# Patient Record
Sex: MALE | Race: WHITE | Employment: FULL TIME
[De-identification: names, ages, dates, MRNs, and addresses within clinical notes are randomized per-mention and may not be internally consistent; named-entity substitution may affect disease eponyms.]

---

## 2020-10-26 ENCOUNTER — NURSE TRIAGE (OUTPATIENT)
Dept: OTHER | Facility: CLINIC | Age: 30
End: 2020-10-26

## 2020-10-26 NOTE — TELEPHONE ENCOUNTER
Reason for Disposition   Pain also in shoulder(s) or arm(s) or jaw    Answer Assessment - Initial Assessment Questions  1. LOCATION: \"Where does it hurt? \"        Left chest    2. RADIATION: \"Does the pain go anywhere else? \" (e.g., into neck, jaw, arms, back)      Left shoulder    3. ONSET: \"When did the chest pain begin? \" (Minutes, hours or days)       Every other week for the last two months. This episode started today. 4. PATTERN \"Does the pain come and go, or has it been constant since it started? \"  \"Does it get worse with exertion? \"       Comes and goes    5. DURATION: \"How long does it last\" (e.g., seconds, minutes, hours)      The first time it was about a day and then a few days the second time. It is a dull pain that lasts all day     6. SEVERITY: \"How bad is the pain? \"  (e.g., Scale 1-10; mild, moderate, or severe)     - MILD (1-3): doesn't interfere with normal activities      - MODERATE (4-7): interferes with normal activities or awakens from sleep     - SEVERE (8-10): excruciating pain, unable to do any normal activities        Mild, but there are waves of moderate pain    7. CARDIAC RISK FACTORS: \"Do you have any history of heart problems or risk factors for heart disease? \" (e.g., angina, prior heart attack; diabetes, high blood pressure, high cholesterol, smoker, or strong family history of heart disease)      Denies - family history    8. PULMONARY RISK FACTORS: \"Do you have any history of lung disease? \"  (e.g., blood clots in lung, asthma, emphysema, birth control pills)      Denies    9. CAUSE: \"What do you think is causing the chest pain? \"      Unknown - is concerned about family history of heart disease    10. OTHER SYMPTOMS: \"Do you have any other symptoms? \" (e.g., dizziness, nausea, vomiting, sweating, fever, difficulty breathing, cough)        Denies all listed    11. PREGNANCY: \"Is there any chance you are pregnant? \" \"When was your last menstrual period? \"        N/A    Protocols used:

## 2020-10-28 ENCOUNTER — VIRTUAL VISIT (OUTPATIENT)
Dept: FAMILY MEDICINE CLINIC | Age: 30
End: 2020-10-28
Payer: COMMERCIAL

## 2020-10-28 PROCEDURE — G8484 FLU IMMUNIZE NO ADMIN: HCPCS | Performed by: FAMILY MEDICINE

## 2020-10-28 PROCEDURE — G8421 BMI NOT CALCULATED: HCPCS | Performed by: FAMILY MEDICINE

## 2020-10-28 PROCEDURE — G8427 DOCREV CUR MEDS BY ELIG CLIN: HCPCS | Performed by: FAMILY MEDICINE

## 2020-10-28 PROCEDURE — 4004F PT TOBACCO SCREEN RCVD TLK: CPT | Performed by: FAMILY MEDICINE

## 2020-10-28 PROCEDURE — 99203 OFFICE O/P NEW LOW 30 MIN: CPT | Performed by: FAMILY MEDICINE

## 2020-10-28 ASSESSMENT — PATIENT HEALTH QUESTIONNAIRE - PHQ9
SUM OF ALL RESPONSES TO PHQ QUESTIONS 1-9: 0
2. FEELING DOWN, DEPRESSED OR HOPELESS: 0
SUM OF ALL RESPONSES TO PHQ9 QUESTIONS 1 & 2: 0
SUM OF ALL RESPONSES TO PHQ QUESTIONS 1-9: 0
SUM OF ALL RESPONSES TO PHQ QUESTIONS 1-9: 0
1. LITTLE INTEREST OR PLEASURE IN DOING THINGS: 0

## 2020-10-28 NOTE — PROGRESS NOTES
10/28/2020    TELEHEALTH EVALUATION -- Audio/Visual (During VVYON-47 public health emergency)    HPI:    Fabby Barajas (:  1990) has requested an audio/video evaluation for the following concern(s):    ER followup and to re-establish care. Was seen at [facility] ER on 10/26/20 for chest pain. Has had multiple episodes in past 2 months. Usually occurs at rest and is on L side of chest. Does not occur w exertion. Pain is a dull \"muscular\" pain that is 1-2/10 in intensity. Every once in a while gets worse. Usually lasts about 2 days and is constant during that time. No dizziness, shortness of breath. Pain does radiate to L shoulder. famhx early heart disease- PGF had MI at 39. Dad had CABG x 4 in late 45s. MGF had CABG in his 76s. No new activity lately. Pain is still present today but is very mild. No leg swelling. Review of Systems:  Gen:  Denies fever, chills, headaches. No weight loss  HEENT:  Denies cold symptoms, sore throat. CV:  Denies palpitations. Pulm:  Denies shortness of breath, cough. Abd:  Denies abdominal pain, change in bowel habits. Prior to Visit Medications    Not on File       No past medical history on file. Past Surgical History:   Procedure Laterality Date    WISDOM TOOTH EXTRACTION         Family History   Problem Relation Age of Onset    Heart Disease Paternal Grandfather 39    Heart Disease Father 39        CABG x 3    Heart Disease Maternal Grandfather 67        CABG x 3       No Known Allergies    Social History     Tobacco Use    Smoking status: Never Smoker    Smokeless tobacco: Never Used   Substance Use Topics    Alcohol use: Not on file    Drug use: Not on file          PHYSICAL EXAMINATION:  Vital Signs: (As obtained by patient/caregiver or practitioner observation)  There were no vitals taken for this visit.   Patient-Reported Vitals 10/28/2020   Patient-Reported Weight 215 lbs   Patient-Reported Height 6'4        Respiratory rate appears normal      Constitutional: Appears well-developed and well-nourished. No apparent distress    Mental status: Alert and awake. Oriented to person/place/time. Able to follow commands    Eyes: EOM normal. Sclera normal. No discharge visible  HENT: Normocephalic, atraumatic. Mouth/Throat: Mucous membranes are moist. External Ears Normal    Neck: No visualized mass   Pulmonary/Chest: Respiratory effort normal.  No visualized signs of difficulty breathing or respiratory distress        Musculoskeletal:  Normal range of motion of neck  Neurological:       No Facial Asymmetry (Cranial nerve 7 motor function) (limited exam to video visit). No gaze palsy       Skin:  No significant exanthematous lesions or discoloration noted on facial skin       Psychiatric: Normal Affect. No Hallucinations            ASSESSMENT/PLAN:  1. Chest pain, unspecified type  Labs as ordered  Will get stress test given famhx if unremarkable  - D-Dimer, Quantitative; Future  - Comprehensive Metabolic Panel; Future  - Lipid Panel; Future  - TSH with Reflex; Future    2. Family history of heart disease in male family member before age 54  - Lipid Panel; Future    3. Healthcare maintenance  - Comprehensive Metabolic Panel; Future  - Lipid Panel; Future  - TSH with Reflex; Future      No follow-ups on file. Katerine Garcia is a 30 y.o. male being evaluated by a Virtual Visit (video visit) encounter to address concerns as mentioned above. A caregiver was present when appropriate. Due to this being a TeleHealth encounter (During SVFAX-57 public health emergency), evaluation of the following organ systems was limited: Vitals/Constitutional/EENT/Resp/CV/GI//MS/Neuro/Skin/Heme-Lymph-Imm.   Pursuant to the emergency declaration under the [facility], [facility]5 waiver authority and the [employer] and [employer] Act, this Virtual Visit was conducted with patient's (and/or legal guardian's) consent, to reduce the patient's risk of exposure to COVID-19 and provide necessary medical care. The patient (and/or legal guardian) has also been advised to contact this office for worsening conditions or problems, and seek emergency medical treatment and/or call 911 if deemed necessary. Patient identification was verified at the start of the visit: Yes    Total time spent on this encounter: 20 minutes. Services were provided through a video synchronous discussion virtually to substitute for in-person clinic visit. Patient was located in their home. Provider was located in the office. --Aline Castle MD on 10/28/2020 at 5:52 PM    An electronic signature was used to authenticate this note. Natan Bush

## 2020-10-29 DIAGNOSIS — Z00.00 HEALTHCARE MAINTENANCE: ICD-10-CM

## 2020-10-29 DIAGNOSIS — R07.9 CHEST PAIN, UNSPECIFIED TYPE: ICD-10-CM

## 2020-10-29 DIAGNOSIS — Z82.49 FAMILY HISTORY OF HEART DISEASE IN MALE FAMILY MEMBER BEFORE AGE 55: ICD-10-CM

## 2020-10-29 LAB
A/G RATIO: 1.8 (ref 1.1–2.2)
ALBUMIN SERPL-MCNC: 4.7 G/DL (ref 3.4–5)
ALP BLD-CCNC: 98 U/L (ref 40–129)
ALT SERPL-CCNC: 20 U/L (ref 10–40)
ANION GAP SERPL CALCULATED.3IONS-SCNC: 14 MMOL/L (ref 3–16)
AST SERPL-CCNC: 16 U/L (ref 15–37)
BILIRUB SERPL-MCNC: 1.4 MG/DL (ref 0–1)
BUN BLDV-MCNC: 16 MG/DL (ref 7–20)
CALCIUM SERPL-MCNC: 9.8 MG/DL (ref 8.3–10.6)
CHLORIDE BLD-SCNC: 99 MMOL/L (ref 99–110)
CHOLESTEROL, TOTAL: 122 MG/DL (ref 0–199)
CO2: 26 MMOL/L (ref 21–32)
CREAT SERPL-MCNC: 1 MG/DL (ref 0.9–1.3)
D DIMER: 1981 NG/ML DDU (ref 0–229)
GFR AFRICAN AMERICAN: >60
GFR NON-AFRICAN AMERICAN: >60
GLOBULIN: 2.6 G/DL
GLUCOSE BLD-MCNC: 84 MG/DL (ref 70–99)
HDLC SERPL-MCNC: 37 MG/DL (ref 40–60)
LDL CHOLESTEROL CALCULATED: 59 MG/DL
POTASSIUM SERPL-SCNC: 4.6 MMOL/L (ref 3.5–5.1)
SODIUM BLD-SCNC: 139 MMOL/L (ref 136–145)
TOTAL PROTEIN: 7.3 G/DL (ref 6.4–8.2)
TRIGL SERPL-MCNC: 128 MG/DL (ref 0–150)
TSH REFLEX: 1.01 UIU/ML (ref 0.27–4.2)
VLDLC SERPL CALC-MCNC: 26 MG/DL

## 2020-10-30 ENCOUNTER — TELEPHONE (OUTPATIENT)
Dept: FAMILY MEDICINE CLINIC | Age: 30
End: 2020-10-30

## 2020-10-30 NOTE — TELEPHONE ENCOUNTER
Reviewed the labs and elevated d dimer and has symptoms of Chest pain and hence requested to go to get ER to get the CTA of lungs to rule out blood clots

## 2020-11-04 ENCOUNTER — VIRTUAL VISIT (OUTPATIENT)
Dept: FAMILY MEDICINE CLINIC | Age: 30
End: 2020-11-04
Payer: COMMERCIAL

## 2020-11-04 PROCEDURE — G8427 DOCREV CUR MEDS BY ELIG CLIN: HCPCS | Performed by: FAMILY MEDICINE

## 2020-11-04 PROCEDURE — 99213 OFFICE O/P EST LOW 20 MIN: CPT | Performed by: FAMILY MEDICINE

## 2020-11-04 NOTE — PROGRESS NOTES
2020    TELEHEALTH EVALUATION -- Audio/Visual (During HADJV-17 public health emergency)    HPI:    Bijan Mcgregor (:  1990) has requested an audio/video evaluation for the following concern(s):    F/u labs/CT/chest pain. Has not had any further chest pain since last visit. No shortness of breath. D dimer did come back elevated and he had CT done. Is concerned about heart disease given his family hx. Review of Systems:  Gen:  Denies fever, chills, headaches. No weight loss  HEENT:  Denies cold symptoms, sore throat. CV:  Denies chest pain or tightness, palpitations. Pulm:  Denies shortness of breath, cough. Abd:  Denies abdominal pain, change in bowel habits. Prior to Visit Medications    Not on File       No past medical history on file. Past Surgical History:   Procedure Laterality Date    WISDOM TOOTH EXTRACTION         Family History   Problem Relation Age of Onset    Heart Disease Paternal Grandfather 39    Heart Disease Father 39        CABG x 3    Heart Disease Maternal Grandfather 67        CABG x 3       No Known Allergies    Social History     Tobacco Use    Smoking status: Never Smoker    Smokeless tobacco: Never Used   Substance Use Topics    Alcohol use: Not on file    Drug use: Not on file          PHYSICAL EXAMINATION:  Vital Signs: (As obtained by patient/caregiver or practitioner observation)  There were no vitals taken for this visit. Patient-Reported Vitals 2020   Patient-Reported Weight -   Patient-Reported Height 6 4        Respiratory rate appears normal      Constitutional: Appears well-developed and well-nourished. No apparent distress    Mental status: Alert and awake. Oriented to person/place/time. Able to follow commands    Eyes: EOM normal. Sclera normal. No discharge visible  HENT: Normocephalic, atraumatic.    Mouth/Throat: Mucous membranes are moist. External Ears Normal    Neck: No visualized mass   Pulmonary/Chest: Respiratory effort normal.  No visualized signs of difficulty breathing or respiratory distress        Musculoskeletal:  Normal range of motion of neck  Neurological:       No Facial Asymmetry (Cranial nerve 7 motor function) (limited exam to video visit). No gaze palsy       Skin:  No significant exanthematous lesions or discoloration noted on facial skin       Psychiatric: Normal Affect. No Hallucinations              ASSESSMENT/PLAN:  1. Chest pain, unspecified type  Workup reviewed w pt today. Labs remarkable for elevated D dimer of 1,981. Sent for CT which was negative. Bedside cardiac US neg for pericardial effusion as well. Will get stress test given famhx  To ER if pain recurs  - CARDIAC STRESS TEST EXERCISE ONLY; Future    2. Family history of heart disease in male family member before age 54  - Roheline 44; Future      No follow-ups on file. Lysbeth Ormond is a 30 y.o. male being evaluated by a Virtual Visit (video visit) encounter to address concerns as mentioned above. A caregiver was present when appropriate. Due to this being a TeleHealth encounter (During [facility]- public health emergency), evaluation of the following organ systems was limited: Vitals/Constitutional/EENT/Resp/CV/GI//MS/Neuro/Skin/Heme-Lymph-Imm. Pursuant to the emergency declaration under the [facility], [address] [place] [address] authority and the [employer] and [employer] Act, this Virtual Visit was conducted with patient's (and/or legal guardian's) consent, to reduce the patient's risk of exposure to COVID-19 and provide necessary medical care. The patient (and/or legal guardian) has also been advised to contact this office for worsening conditions or problems, and seek emergency medical treatment and/or call 911 if deemed necessary. Patient identification was verified at the start of the visit: Yes    Total time spent on this encounter: 12 minutes.      Services were provided through a video synchronous discussion virtually to substitute for in-person clinic visit. Patient was located in their home. Provider was located in their home. --Roxana Avila MD on 11/4/2020 at 1:38 PM    An electronic signature was used to authenticate this note. Alaina Estevez

## 2020-11-16 ENCOUNTER — OFFICE VISIT (OUTPATIENT)
Dept: PRIMARY CARE CLINIC | Age: 30
End: 2020-11-16
Payer: COMMERCIAL

## 2020-11-16 PROCEDURE — 99211 OFF/OP EST MAY X REQ PHY/QHP: CPT | Performed by: NURSE PRACTITIONER

## 2020-11-16 NOTE — PROGRESS NOTES
Jackson Shah received a viral test for COVID-19. They were educated on isolation and quarantine as appropriate. For any symptoms, they were directed to seek care from their PCP, given contact information to establish with a doctor, directed to an urgent care or the emergency room.

## 2020-11-16 NOTE — PATIENT INSTRUCTIONS

## 2020-11-17 LAB — SARS-COV-2: NOT DETECTED

## 2020-12-02 ENCOUNTER — HOSPITAL ENCOUNTER (OUTPATIENT)
Dept: NON INVASIVE DIAGNOSTICS | Age: 30
Discharge: HOME | End: 2020-12-02
Payer: COMMERCIAL

## 2020-12-02 LAB
LV EF: 55 %
LVEF MODALITY: NORMAL

## 2020-12-02 PROCEDURE — 3430000000 HC RX DIAGNOSTIC RADIOPHARMACEUTICAL: Performed by: FAMILY MEDICINE

## 2020-12-02 PROCEDURE — A9502 TC99M TETROFOSMIN: HCPCS | Performed by: FAMILY MEDICINE

## 2020-12-02 PROCEDURE — 78452 HT MUSCLE IMAGE SPECT MULT: CPT | Performed by: INTERNAL MEDICINE

## 2020-12-02 PROCEDURE — 93017 CV STRESS TEST TRACING ONLY: CPT | Performed by: INTERNAL MEDICINE

## 2020-12-02 RX ADMIN — TETROFOSMIN 10 MILLICURIE: 1.38 INJECTION, POWDER, LYOPHILIZED, FOR SOLUTION INTRAVENOUS at 08:19

## 2020-12-02 RX ADMIN — TETROFOSMIN 30 MILLICURIE: 1.38 INJECTION, POWDER, LYOPHILIZED, FOR SOLUTION INTRAVENOUS at 09:28

## 2020-12-02 NOTE — PROGRESS NOTES
Patient instructed on Juan Carlos Protocol Stress Test Procedure including possible side effects and adverse reactions. Verbalizes knowledge and understanding and denies having any questions.

## 2023-02-08 ENCOUNTER — OFFICE VISIT (OUTPATIENT)
Dept: FAMILY MEDICINE CLINIC | Age: 33
End: 2023-02-08
Payer: COMMERCIAL

## 2023-02-08 VITALS
OXYGEN SATURATION: 97 % | SYSTOLIC BLOOD PRESSURE: 128 MMHG | BODY MASS INDEX: 25.82 KG/M2 | WEIGHT: 212 LBS | HEIGHT: 76 IN | DIASTOLIC BLOOD PRESSURE: 64 MMHG | HEART RATE: 82 BPM

## 2023-02-08 DIAGNOSIS — Z00.00 ANNUAL PHYSICAL EXAM: Primary | ICD-10-CM

## 2023-02-08 PROCEDURE — 99395 PREV VISIT EST AGE 18-39: CPT | Performed by: FAMILY MEDICINE

## 2023-02-08 PROCEDURE — G8484 FLU IMMUNIZE NO ADMIN: HCPCS | Performed by: FAMILY MEDICINE

## 2023-02-08 ASSESSMENT — PATIENT HEALTH QUESTIONNAIRE - PHQ9
SUM OF ALL RESPONSES TO PHQ QUESTIONS 1-9: 0
1. LITTLE INTEREST OR PLEASURE IN DOING THINGS: 0
2. FEELING DOWN, DEPRESSED OR HOPELESS: 0
SUM OF ALL RESPONSES TO PHQ QUESTIONS 1-9: 0
SUM OF ALL RESPONSES TO PHQ9 QUESTIONS 1 & 2: 0

## 2023-02-08 NOTE — PROGRESS NOTES
SUBJECTIVE:   Thelma Bowling is a 33 y.o. male presenting for his annual checkup. HPI:    Had life insurance exam last year. TGs were high. Not watching diet. Strong famhx CAD. There is no problem list on file for this patient. No past medical history on file. Past Surgical History:   Procedure Laterality Date    WISDOM TOOTH EXTRACTION         Social History     Socioeconomic History    Marital status:      Spouse name: None    Number of children: 0    Years of education: None    Highest education level: None   Occupational History    Occupation:      Comment: [employer]   Tobacco Use    Smoking status: Never    Smokeless tobacco: Never   Substance and Sexual Activity    Sexual activity: Yes     Partners: Female       Family History   Problem Relation Age of Onset    Heart Disease Father 54        CABG x 4    Heart Disease Maternal Grandfather 70        CABG x 4    Heart Disease Paternal Grandfather 39       No current outpatient medications on file. No current facility-administered medications for this visit. Allergies: Patient has no known allergies.      Health Maintenance   Topic Date Due    COVID-19 Vaccine (1) Never done    Varicella vaccine (1 of 2 - 2-dose childhood series) Never done    Depression Screen  Never done    HIV screen  Never done    Hepatitis C screen  Never done    Flu vaccine (1) 08/01/2022    DTaP/Tdap/Td vaccine (7 - Td or Tdap) 03/02/2026    Hib vaccine  Completed    Meningococcal (ACWY) vaccine  Completed    Hepatitis A vaccine  Aged Out    Pneumococcal 0-64 years Vaccine  Aged Out         ROS:    Skin: no changing moles, abnormal pigmentation, rash, scaling, itching, masses, hair or nail changes  Eyes: no change in vision  Ears/Nose/Throat: no hearing loss, tinnitus, vertigo, nosebleed, nasal congestion, rhinorrhea, sore throat  Respiratory: no cough or shortness of breath  Cardiovascular: no chest pain, FREITAS, orthopnea, PND, palpitations, or claudication  Gastrointestinal: no nausea, vomiting, abdominal pain or change in stools. No blood in stools. Genitourinary: no urinary symptoms or incontinence. No erectile dysfunction. Musculoskeletal: no arthritis, arthralgia, myalgia or weakness  Neurologic: no weakness/numbness, seizures, or headaches  Hematologic/Lymphatic/Immunologic: no abnormal bleeding/bruising, fever, chills, night sweats, swollen glands, or unexplained weight loss  Endocrine: no heat or cold intolerance and no polyphagia, polydipsia, or polyuria    OBJECTIVE:   /64   Pulse 82   Ht 6' 4\" (1.93 m)   Wt 212 lb (96.2 kg)   SpO2 97%   BMI 25.81 kg/m²   General appearance: healthy, alert, no distress  Skin: Skin color, texture, turgor normal. No rashes or suspicious lesions. No induration or tightening palpated. Head: Normocephalic. No masses, lesions, tenderness or abnormalities  Eyes: Conjunctivae/corneas clear. PERRL, EOM's intact. Ears: External ears normal. Canals clear. TM's clear bilaterally. Hearing grossly normal.  Nose/Sinuses: Nares normal.   Oropharynx: Lips, mucosa, and tongue normal. Teeth and gums normal. Oropharynx clear with no exudate seen. Neck: Neck supple, and symmetric. No adenopathy. Thyroid symmetric, normal size, without nodule. Trachea is midline. Back: Back symmetric, no curvature. ROM normal. No CVA tenderness. Lungs: Good diaphragmatic excursion. Lungs clear to auscultation bilaterally. No retractions or use of accessory muscles. Heart: PMI is not displaced, and no thrill noted. Regular rate and rhythm, with no rub, murmur or gallop noted. Abdomen: Abdomen soft, non-tender. BS normal. No masses, organomegaly. No hernia noted. Extremities: Extremities normal. No deformities, edema, or skin discoloration. No cyanosis or clubbing noted to the nails. Hands and feet were warm and well-perfused with palpable dorsalis pedis pulses bilaterally.   Lymph: No lymphadenopathy of the neck or supraclavicular regions. Musculoskeletal: Spine ROM normal. Muscular strength intact. Neuro: Cranial nerves intact, gait normal. No focal weakness. ASSESSMENT/PLAN:  1. Annual physical exam  Labs as ordered  Diet/exercise discussed   Monitor home BP   - CBC with Auto Differential; Future  - Comprehensive Metabolic Panel; Future  - Lipid Panel; Future      All health maintenance issues were updated.   Recommend begin progressive daily aerobic exercise program, follow a low fat, low cholesterol diet, and reduce salt in diet and cooking

## 2023-06-22 ENCOUNTER — TELEMEDICINE (OUTPATIENT)
Dept: FAMILY MEDICINE CLINIC | Age: 33
End: 2023-06-22
Payer: COMMERCIAL

## 2023-06-22 DIAGNOSIS — J01.90 ACUTE BACTERIAL SINUSITIS: Primary | ICD-10-CM

## 2023-06-22 DIAGNOSIS — B96.89 ACUTE BACTERIAL SINUSITIS: Primary | ICD-10-CM

## 2023-06-22 PROCEDURE — 99213 OFFICE O/P EST LOW 20 MIN: CPT | Performed by: FAMILY MEDICINE

## 2023-06-22 PROCEDURE — G8428 CUR MEDS NOT DOCUMENT: HCPCS | Performed by: FAMILY MEDICINE

## 2023-06-22 RX ORDER — AMOXICILLIN 500 MG/1
500 CAPSULE ORAL 2 TIMES DAILY
Qty: 20 CAPSULE | Refills: 0 | Status: SHIPPED | OUTPATIENT
Start: 2023-06-22 | End: 2023-07-02

## 2023-06-22 NOTE — PROGRESS NOTES
2023    TELEHEALTH EVALUATION -- Audio/Visual (During BBFYG-72 public health emergency)    HPI:    Anna Ding (:  1990) has requested an audio/video evaluation for the following concern(s):    C/o 7 day hx of headache/sinus pressure, yellow mucus & cough, post nasal drainage. Symptoms seem to be worsening, not improving. Review of Systems:  Gen:  Denies fever, chills, headaches. No weight loss  HEENT:  Denies cold symptoms, sore throat. CV:  Denies chest pain or tightness, palpitations. Pulm:  Denies shortness of breath, cough. Abd:  Denies abdominal pain, change in bowel habits. Prior to Visit Medications    Not on File       No past medical history on file. Past Surgical History:   Procedure Laterality Date    WISDOM TOOTH EXTRACTION         Family History   Problem Relation Age of Onset    Heart Disease Father 54        CABG x 4    Heart Disease Maternal Grandfather 70        CABG x 4    Heart Disease Paternal Grandfather 39       No Known Allergies    Social History     Tobacco Use    Smoking status: Never    Smokeless tobacco: Never          PHYSICAL EXAMINATION:  Vital Signs: (As obtained by patient/caregiver or practitioner observation)  There were no vitals taken for this visit. Patient-Reported Vitals 2020   Patient-Reported Weight -   Patient-Reported Height 6 4        Respiratory rate appears normal      Constitutional: Appears well-developed and well-nourished. No apparent distress    Mental status: Alert and awake. Oriented to person/place/time. Able to follow commands    Eyes: EOM normal. Sclera normal. No discharge visible  HENT: Normocephalic, atraumatic.    Mouth/Throat: Mucous membranes are moist. External Ears Normal    Neck: No visualized mass   Pulmonary/Chest: Respiratory effort normal.  No visualized signs of difficulty breathing or respiratory distress        Musculoskeletal:  Normal range of motion of neck  Neurological:       No Facial Asymmetry (Cranial

## 2024-05-16 ENCOUNTER — TELEMEDICINE (OUTPATIENT)
Dept: FAMILY MEDICINE CLINIC | Age: 34
End: 2024-05-16

## 2024-05-16 DIAGNOSIS — L23.7 POISON IVY: Primary | ICD-10-CM

## 2024-05-16 RX ORDER — PREDNISONE 10 MG/1
10 TABLET ORAL DAILY
Qty: 18 TABLET | Refills: 0 | Status: SHIPPED | OUTPATIENT
Start: 2024-05-16

## 2024-05-16 SDOH — ECONOMIC STABILITY: FOOD INSECURITY: WITHIN THE PAST 12 MONTHS, YOU WORRIED THAT YOUR FOOD WOULD RUN OUT BEFORE YOU GOT MONEY TO BUY MORE.: NEVER TRUE

## 2024-05-16 SDOH — ECONOMIC STABILITY: HOUSING INSECURITY
IN THE LAST 12 MONTHS, WAS THERE A TIME WHEN YOU DID NOT HAVE A STEADY PLACE TO SLEEP OR SLEPT IN A SHELTER (INCLUDING NOW)?: NO

## 2024-05-16 SDOH — ECONOMIC STABILITY: FOOD INSECURITY: WITHIN THE PAST 12 MONTHS, THE FOOD YOU BOUGHT JUST DIDN'T LAST AND YOU DIDN'T HAVE MONEY TO GET MORE.: NEVER TRUE

## 2024-05-16 SDOH — ECONOMIC STABILITY: INCOME INSECURITY: HOW HARD IS IT FOR YOU TO PAY FOR THE VERY BASICS LIKE FOOD, HOUSING, MEDICAL CARE, AND HEATING?: NOT HARD AT ALL

## 2024-05-16 ASSESSMENT — PATIENT HEALTH QUESTIONNAIRE - PHQ9
1. LITTLE INTEREST OR PLEASURE IN DOING THINGS: NOT AT ALL
SUM OF ALL RESPONSES TO PHQ9 QUESTIONS 1 & 2: 0
SUM OF ALL RESPONSES TO PHQ QUESTIONS 1-9: 0
2. FEELING DOWN, DEPRESSED OR HOPELESS: NOT AT ALL
SUM OF ALL RESPONSES TO PHQ QUESTIONS 1-9: 0

## 2024-05-16 NOTE — PROGRESS NOTES
2024    TELEHEALTH EVALUATION -- Audio/Visual (During COVID-19 public health emergency)    HPI:    Miguel A Gonzalez (:  1990) has requested an audio/video evaluation for the following concern(s):    C/o poison ivy down whole left leg and left eyelid. Started yesterday. Has taken benadryl with some improvement.   Is prone to severe reaction to poison ivy.     Review of Systems:  Gen:  Denies fever, chills, headaches. No weight loss  HEENT:  Denies cold symptoms, sore throat.  CV:  Denies chest pain or tightness, palpitations.  Pulm:  Denies shortness of breath, cough.  Abd:  Denies abdominal pain, change in bowel habits.    Prior to Visit Medications    Not on File       No past medical history on file.    Past Surgical History:   Procedure Laterality Date    WISDOM TOOTH EXTRACTION         Family History   Problem Relation Age of Onset    Heart Disease Father 55        CABG x 4    Heart Disease Maternal Grandfather 71        CABG x 4    Heart Disease Paternal Grandfather 41       No Known Allergies    Social History     Tobacco Use    Smoking status: Never    Smokeless tobacco: Never          PHYSICAL EXAMINATION:  Vital Signs: (As obtained by patient/caregiver or practitioner observation)  There were no vitals taken for this visit.      2024    10:04 AM   Patient-Reported Vitals   Patient-Reported Weight 214   Patient-Reported Height 6'4\"        Respiratory rate appears normal      Constitutional: Appears well-developed and well-nourished. No apparent distress    Mental status: Alert and awake. Oriented to person/place/time. Able to follow commands    Eyes: EOM normal. Sclera normal. No discharge visible  HENT: Normocephalic, atraumatic.   Mouth/Throat: Mucous membranes are moist. External Ears Normal    Neck: No visualized mass   Pulmonary/Chest: Respiratory effort normal.  No visualized signs of difficulty breathing or respiratory distress        Musculoskeletal:  Normal range of motion of

## 2025-05-22 ENCOUNTER — TELEMEDICINE (OUTPATIENT)
Dept: FAMILY MEDICINE CLINIC | Age: 35
End: 2025-05-22
Payer: COMMERCIAL

## 2025-05-22 DIAGNOSIS — J01.90 ACUTE BACTERIAL SINUSITIS: Primary | ICD-10-CM

## 2025-05-22 DIAGNOSIS — B96.89 ACUTE BACTERIAL SINUSITIS: Primary | ICD-10-CM

## 2025-05-22 PROCEDURE — G2211 COMPLEX E/M VISIT ADD ON: HCPCS | Performed by: FAMILY MEDICINE

## 2025-05-22 PROCEDURE — 99213 OFFICE O/P EST LOW 20 MIN: CPT | Performed by: FAMILY MEDICINE

## 2025-05-22 RX ORDER — AZITHROMYCIN 250 MG/1
TABLET, FILM COATED ORAL
Qty: 6 TABLET | Refills: 0 | Status: SHIPPED | OUTPATIENT
Start: 2025-05-22 | End: 2025-06-01

## 2025-05-22 SDOH — ECONOMIC STABILITY: FOOD INSECURITY: WITHIN THE PAST 12 MONTHS, THE FOOD YOU BOUGHT JUST DIDN'T LAST AND YOU DIDN'T HAVE MONEY TO GET MORE.: NEVER TRUE

## 2025-05-22 SDOH — ECONOMIC STABILITY: FOOD INSECURITY: WITHIN THE PAST 12 MONTHS, YOU WORRIED THAT YOUR FOOD WOULD RUN OUT BEFORE YOU GOT MONEY TO BUY MORE.: NEVER TRUE

## 2025-05-22 ASSESSMENT — PATIENT HEALTH QUESTIONNAIRE - PHQ9
1. LITTLE INTEREST OR PLEASURE IN DOING THINGS: NOT AT ALL
SUM OF ALL RESPONSES TO PHQ QUESTIONS 1-9: 0
2. FEELING DOWN, DEPRESSED OR HOPELESS: NOT AT ALL

## 2025-05-22 NOTE — PROGRESS NOTES
2025    TELEHEALTH EVALUATION -- Audio/Visual (During COVID-19 public health emergency)    HPI:    Miguel A Gonzalez (:  1990) has requested an audio/video evaluation for the following concern(s):    C/o facial/sinus pressure, post nasal drainage, coughing up yellow sputum, head congestion that has been worsening x 2 days. No fever/chills. No nausea or vomiting.     Review of Systems:  Gen:  Denies fever, chills. No weight loss  HEENT:  Denies sore throat.  CV:  Denies chest pain or tightness, palpitations.  Pulm:  Denies shortness of breath.  Abd:  Denies abdominal pain, change in bowel habits.    Prior to Visit Medications    Not on File       No past medical history on file.    Past Surgical History:   Procedure Laterality Date    WISDOM TOOTH EXTRACTION         Family History   Problem Relation Age of Onset    Heart Disease Father 55        CABG x 4    Heart Disease Maternal Grandfather 71        CABG x 4    Heart Disease Paternal Grandfather 41       No Known Allergies    Social History     Tobacco Use    Smoking status: Never    Smokeless tobacco: Never          PHYSICAL EXAMINATION:  Vital Signs: (As obtained by patient/caregiver or practitioner observation)  There were no vitals taken for this visit.      2024    10:04 AM   Patient-Reported Vitals   Patient-Reported Weight 214   Patient-Reported Height 6'4\"        Respiratory rate appears normal      Constitutional: Appears well-developed and well-nourished. No apparent distress    Mental status: Alert and awake. Oriented to person/place/time. Able to follow commands    Eyes: EOM normal. Sclera normal. No discharge visible  HENT: Normocephalic, atraumatic.   Mouth/Throat: Mucous membranes are moist. External Ears Normal    Neck: No visualized mass   Pulmonary/Chest: Respiratory effort normal.  No visualized signs of difficulty breathing or respiratory distress        Musculoskeletal:  Normal range of motion of neck  Neurological:       No

## 2025-06-05 ENCOUNTER — OFFICE VISIT (OUTPATIENT)
Dept: FAMILY MEDICINE CLINIC | Age: 35
End: 2025-06-05
Payer: COMMERCIAL

## 2025-06-05 VITALS
SYSTOLIC BLOOD PRESSURE: 128 MMHG | OXYGEN SATURATION: 98 % | HEIGHT: 76 IN | WEIGHT: 216 LBS | BODY MASS INDEX: 26.3 KG/M2 | HEART RATE: 78 BPM | DIASTOLIC BLOOD PRESSURE: 82 MMHG

## 2025-06-05 DIAGNOSIS — Z00.00 ANNUAL PHYSICAL EXAM: Primary | ICD-10-CM

## 2025-06-05 PROCEDURE — 99395 PREV VISIT EST AGE 18-39: CPT | Performed by: FAMILY MEDICINE

## 2025-06-05 NOTE — PROGRESS NOTES
Tdap) 03/02/2026    Depression Screen  05/22/2026    Hepatitis B vaccine  Completed    Hib vaccine  Completed    Polio vaccine  Completed    Meningococcal (ACWY) vaccine  Completed    Hepatitis A vaccine  Aged Out    HPV vaccine  Aged Out    Meningococcal B vaccine  Aged Out    Pneumococcal 0-49 years Vaccine  Aged Out    Varicella vaccine  Discontinued    Hepatitis C screen  Discontinued         ROS:    Skin: no changing moles, abnormal pigmentation, rash, scaling, itching, masses, hair or nail changes  Eyes: no change in vision  Ears/Nose/Throat: no hearing loss, tinnitus, vertigo, nosebleed, nasal congestion, rhinorrhea, sore throat  Respiratory: no cough or shortness of breath  Cardiovascular: no chest pain, FREITAS, orthopnea, PND, palpitations, or claudication  Gastrointestinal: no nausea, vomiting, abdominal pain or change in stools.  No blood in stools.   Genitourinary: no urinary symptoms or incontinence. No erectile dysfunction.  Musculoskeletal: no arthritis, arthralgia, myalgia or weakness  Neurologic: no weakness/numbness, seizures, or headaches  Hematologic/Lymphatic/Immunologic: no abnormal bleeding/bruising, fever, chills, night sweats, swollen glands, or unexplained weight loss  Endocrine: no heat or cold intolerance and no polyphagia, polydipsia, or polyuria    OBJECTIVE:   /82   Pulse 78   Ht 1.93 m (6' 4\")   Wt 98 kg (216 lb)   SpO2 98%   BMI 26.29 kg/m²   General appearance: healthy, alert, no distress  Skin: Skin color, texture, turgor normal. No rashes or suspicious lesions.  No induration or tightening palpated.  Head: Normocephalic. No masses, lesions, tenderness or abnormalities  Eyes: Conjunctivae/corneas clear. PERRL, EOM's intact.  Ears: External ears normal. Canals clear. TM's clear bilaterally. Hearing grossly normal.  Nose/Sinuses: Nares normal.   Oropharynx: Lips, mucosa, and tongue normal. Teeth and gums normal. Oropharynx clear with no exudate seen.  Neck: Neck supple, and